# Patient Record
Sex: MALE | Race: WHITE | NOT HISPANIC OR LATINO | Employment: OTHER | URBAN - METROPOLITAN AREA
[De-identification: names, ages, dates, MRNs, and addresses within clinical notes are randomized per-mention and may not be internally consistent; named-entity substitution may affect disease eponyms.]

---

## 2017-02-24 ENCOUNTER — APPOINTMENT (OUTPATIENT)
Dept: GENERAL RADIOLOGY | Facility: CLINIC | Age: 62
End: 2017-02-24
Attending: EMERGENCY MEDICINE
Payer: COMMERCIAL

## 2017-02-24 ENCOUNTER — HOSPITAL ENCOUNTER (OUTPATIENT)
Facility: CLINIC | Age: 62
Setting detail: OBSERVATION
Discharge: HOME | End: 2017-02-25
Attending: EMERGENCY MEDICINE | Admitting: INTERNAL MEDICINE
Payer: COMMERCIAL

## 2017-02-24 DIAGNOSIS — R06.09 DYSPNEA ON EXERTION: ICD-10-CM

## 2017-02-24 DIAGNOSIS — I10 BENIGN ESSENTIAL HYPERTENSION: Primary | ICD-10-CM

## 2017-02-24 LAB
ANION GAP SERPL CALCULATED.3IONS-SCNC: 9 MMOL/L (ref 3–14)
BASOPHILS # BLD AUTO: 0.1 10E9/L (ref 0–0.2)
BASOPHILS NFR BLD AUTO: 1 %
BUN SERPL-MCNC: 13 MG/DL (ref 7–30)
CALCIUM SERPL-MCNC: 8.5 MG/DL (ref 8.5–10.1)
CHLORIDE SERPL-SCNC: 102 MMOL/L (ref 94–109)
CO2 SERPL-SCNC: 28 MMOL/L (ref 20–32)
CREAT SERPL-MCNC: 0.88 MG/DL (ref 0.66–1.25)
D DIMER PPP FEU-MCNC: 0.3 UG/ML FEU (ref 0–0.5)
DIFFERENTIAL METHOD BLD: NORMAL
EOSINOPHIL # BLD AUTO: 0.2 10E9/L (ref 0–0.7)
EOSINOPHIL NFR BLD AUTO: 2.5 %
ERYTHROCYTE [DISTWIDTH] IN BLOOD BY AUTOMATED COUNT: 12.4 % (ref 10–15)
GFR SERPL CREATININE-BSD FRML MDRD: 89 ML/MIN/1.7M2
GLUCOSE SERPL-MCNC: 96 MG/DL (ref 70–99)
HCT VFR BLD AUTO: 50.4 % (ref 40–53)
HGB BLD-MCNC: 17.1 G/DL (ref 13.3–17.7)
IMM GRANULOCYTES # BLD: 0.1 10E9/L (ref 0–0.4)
IMM GRANULOCYTES NFR BLD: 0.6 %
LYMPHOCYTES # BLD AUTO: 2.7 10E9/L (ref 0.8–5.3)
LYMPHOCYTES NFR BLD AUTO: 33.7 %
MCH RBC QN AUTO: 30.8 PG (ref 26.5–33)
MCHC RBC AUTO-ENTMCNC: 33.9 G/DL (ref 31.5–36.5)
MCV RBC AUTO: 91 FL (ref 78–100)
MONOCYTES # BLD AUTO: 0.8 10E9/L (ref 0–1.3)
MONOCYTES NFR BLD AUTO: 10.4 %
NEUTROPHILS # BLD AUTO: 4.2 10E9/L (ref 1.6–8.3)
NEUTROPHILS NFR BLD AUTO: 51.8 %
NRBC # BLD AUTO: 0 10*3/UL
NRBC BLD AUTO-RTO: 0 /100
NT-PROBNP SERPL-MCNC: 27 PG/ML (ref 0–900)
PLATELET # BLD AUTO: 321 10E9/L (ref 150–450)
POTASSIUM SERPL-SCNC: 3.4 MMOL/L (ref 3.4–5.3)
RBC # BLD AUTO: 5.56 10E12/L (ref 4.4–5.9)
SODIUM SERPL-SCNC: 139 MMOL/L (ref 133–144)
TROPONIN I SERPL-MCNC: NORMAL UG/L (ref 0–0.04)
WBC # BLD AUTO: 8.1 10E9/L (ref 4–11)

## 2017-02-24 PROCEDURE — 84484 ASSAY OF TROPONIN QUANT: CPT | Performed by: EMERGENCY MEDICINE

## 2017-02-24 PROCEDURE — 83880 ASSAY OF NATRIURETIC PEPTIDE: CPT | Performed by: EMERGENCY MEDICINE

## 2017-02-24 PROCEDURE — 80048 BASIC METABOLIC PNL TOTAL CA: CPT | Performed by: EMERGENCY MEDICINE

## 2017-02-24 PROCEDURE — 93005 ELECTROCARDIOGRAM TRACING: CPT

## 2017-02-24 PROCEDURE — 71020 XR CHEST 2 VW: CPT

## 2017-02-24 PROCEDURE — 85025 COMPLETE CBC W/AUTO DIFF WBC: CPT | Performed by: EMERGENCY MEDICINE

## 2017-02-24 PROCEDURE — 25000132 ZZH RX MED GY IP 250 OP 250 PS 637: Performed by: EMERGENCY MEDICINE

## 2017-02-24 PROCEDURE — 85379 FIBRIN DEGRADATION QUANT: CPT | Performed by: EMERGENCY MEDICINE

## 2017-02-24 PROCEDURE — 99285 EMERGENCY DEPT VISIT HI MDM: CPT | Mod: 25

## 2017-02-24 RX ORDER — LISINOPRIL 10 MG/1
20 TABLET ORAL ONCE
Status: COMPLETED | OUTPATIENT
Start: 2017-02-24 | End: 2017-02-24

## 2017-02-24 RX ORDER — LIDOCAINE 40 MG/G
CREAM TOPICAL
Status: DISCONTINUED | OUTPATIENT
Start: 2017-02-24 | End: 2017-02-25

## 2017-02-24 RX ADMIN — LISINOPRIL 20 MG: 10 TABLET ORAL at 23:21

## 2017-02-24 NOTE — IP AVS SNAPSHOT
[facility] Observation Department    [address]    [facility] [address]    Phone:  [phone]                                       After Visit Summary   2/24/2017    Jorden Benavidez    MRN: [id number]           After Visit Summary Signature Page     I have received my discharge instructions, and my questions have been answered. I have discussed any challenges I see with this plan with the nurse or doctor.    ..........................................................................................................................................  Patient/Patient Representative Signature      ..........................................................................................................................................  Patient Representative Print Name and Relationship to Patient    ..................................................               ................................................  Date                                            Time    ..........................................................................................................................................  Reviewed by Signature/Title    ...................................................              ..............................................  Date                                                            Time

## 2017-02-24 NOTE — IP AVS SNAPSHOT
MRN:[id number]                      After Visit Summary   2/24/2017    Jorden Benavidez    MRN: [id number]           Thank you!     Thank you for choosing [facility] for your care. Our goal is always to provide you with excellent care. Hearing back from our patients is one way we can continue to improve our services. Please take a few minutes to complete the written survey that you may receive in the mail after you visit. If you would like to speak to someone directly about your visit please contact Patient Relations at [phone]. Thank you!          Patient Information     Date Of Birth          1955        About your hospital stay     You were admitted on:  February 25, 2017 You last received care in the:  [facility] Observation Department    You were discharged on:  February 25, 2017        Reason for your hospital stay       Shortness of breath on exertion and hypertension. Serial troponins were negative and stress echocardiogram was negative for significant coronary artery ischemia. Started on Lisinopril for hypertension. Lipid panel was checked and levels are mildly elevated.                  Who to Call     For medical emergencies, please call [phone].  For non-urgent questions about your medical care, please call your primary care provider or clinic, [phone]          Attending Provider     Provider Specialty    Jose D Siu MD Emergency Medicine    [facility]       Primary Care Provider Office Phone # Fax #    [facility] [phone]       [address] [place] [address], [id number]  [facility] [address]         When to contact your care team       Call your primary doctor if you have any of the following: temperature greater than 101, increased shortness of breath or increased chest pain.                  After Care Instructions     Activity       Your activity upon discharge: activity  "as tolerated            Diet       Follow this diet upon discharge: Regular                  Follow-up Appointments     Follow-up and recommended labs and tests        Establish care with primary care provider, [facility], within 7-14 days for hospital follow- up.  The following labs/tests are recommended: BMP due to starting lisinopril.  Discuss lifestyle changes to improve blood pressure and cholesterol.   Discuss lipid panel and statin use if indicated.                             Pending Results     Date and Time Order Name Status Description    2017 2146 EKG 12 lead Preliminary             Statement of Approval     Ordered          17 1208  I have reviewed and agree with all the recommendations and orders detailed in this document.  EFFECTIVE NOW     Approved and electronically signed by:  Eliane Mtz PA-C             Admission Information     Date & Time Provider Department Dept. Phone    2017 Juan C Woo MD [facility] Observation Department [phone]      Your Vitals Were     Blood Pressure Pulse Temperature Respirations Height Weight    145/93 (BP Location: Right arm) 92 97.2  F (36.2  C) (Oral) 16 1.778 m (5' 10\") 94.8 kg (209 lb)    Pulse Oximetry BMI (Body Mass Index)                94% 29.99 kg/m2          MyChart Information     [facility] lets you send messages to your doctor, view your test results, renew your prescriptions, schedule appointments and more. To sign up, go to [email or web address] . Click on \"Log in\" on the left side of the screen, which will take you to the Welcome page. Then click on \"Sign up Now\" on the right side of the page.     You will be asked to enter the access code listed below, as well as some personal information. Please follow the directions to create your username and password.     Your access code is: [email or web address]  Expires: 2017 12:11 PM     Your access code will  in 90 days. If you " need help or a new code, please call your [place] clinic or [phone].        Care EveryWhere ID     This is your Care EveryWhere ID. This could be used by other organizations to access your [place] medical records  [id number]           Review of your medicines      START taking        Dose / Directions    lisinopril 10 MG tablet   Commonly known as:  PRINIVIL/ZESTRIL   Used for:  Benign essential hypertension        Dose:  10 mg   Take 1 tablet (10 mg) by mouth daily   Quantity:  30 tablet   Refills:  0            Where to get your medicines      These medications were sent to [id number] - [place] - [address] [facility]  [address], [place] [address]     Phone:  [phone]     lisinopril 10 MG tablet                Protect others around you: Learn how to safely use, store and throw away your medicines at www.disposemymeds.org.             Medication List: This is a list of all your medications and when to take them. Check marks below indicate your daily home schedule. Keep this list as a reference.      Medications           Morning Afternoon Evening Bedtime As Needed    lisinopril 10 MG tablet   Commonly known as:  PRINIVIL/ZESTRIL   Take 1 tablet (10 mg) by mouth daily   Last time this was given:  10 mg on 2/25/2017 10:15 AM

## 2017-02-25 ENCOUNTER — APPOINTMENT (OUTPATIENT)
Dept: CARDIOLOGY | Facility: CLINIC | Age: 62
End: 2017-02-25
Attending: INTERNAL MEDICINE
Payer: COMMERCIAL

## 2017-02-25 VITALS
DIASTOLIC BLOOD PRESSURE: 93 MMHG | OXYGEN SATURATION: 94 % | SYSTOLIC BLOOD PRESSURE: 145 MMHG | WEIGHT: 209 LBS | TEMPERATURE: 97.2 F | HEART RATE: 92 BPM | HEIGHT: 70 IN | RESPIRATION RATE: 16 BRPM | BODY MASS INDEX: 29.92 KG/M2

## 2017-02-25 PROBLEM — R06.02 SOB (SHORTNESS OF BREATH): Status: ACTIVE | Noted: 2017-02-25

## 2017-02-25 LAB
CHOLEST SERPL-MCNC: 225 MG/DL
HDLC SERPL-MCNC: 42 MG/DL
LDLC SERPL CALC-MCNC: 149 MG/DL
NONHDLC SERPL-MCNC: 183 MG/DL
TRIGL SERPL-MCNC: 171 MG/DL
TROPONIN I SERPL-MCNC: NORMAL UG/L (ref 0–0.04)
TROPONIN I SERPL-MCNC: NORMAL UG/L (ref 0–0.04)
TSH SERPL DL<=0.005 MIU/L-ACNC: 3.18 MU/L (ref 0.4–4)

## 2017-02-25 PROCEDURE — 93018 CV STRESS TEST I&R ONLY: CPT | Performed by: INTERNAL MEDICINE

## 2017-02-25 PROCEDURE — 96374 THER/PROPH/DIAG INJ IV PUSH: CPT

## 2017-02-25 PROCEDURE — 93016 CV STRESS TEST SUPVJ ONLY: CPT | Performed by: INTERNAL MEDICINE

## 2017-02-25 PROCEDURE — 99217 ZZC OBSERVATION CARE DISCHARGE: CPT | Performed by: PHYSICIAN ASSISTANT

## 2017-02-25 PROCEDURE — 84443 ASSAY THYROID STIM HORMONE: CPT | Performed by: INTERNAL MEDICINE

## 2017-02-25 PROCEDURE — 99219 ZZC INITIAL OBSERVATION CARE,LEVL II: CPT | Performed by: INTERNAL MEDICINE

## 2017-02-25 PROCEDURE — 25000132 ZZH RX MED GY IP 250 OP 250 PS 637: Performed by: INTERNAL MEDICINE

## 2017-02-25 PROCEDURE — G0378 HOSPITAL OBSERVATION PER HR: HCPCS

## 2017-02-25 PROCEDURE — 93350 STRESS TTE ONLY: CPT | Mod: 26 | Performed by: INTERNAL MEDICINE

## 2017-02-25 PROCEDURE — 80061 LIPID PANEL: CPT | Performed by: INTERNAL MEDICINE

## 2017-02-25 PROCEDURE — 93321 DOPPLER ECHO F-UP/LMTD STD: CPT | Mod: 26 | Performed by: INTERNAL MEDICINE

## 2017-02-25 PROCEDURE — 93325 DOPPLER ECHO COLOR FLOW MAPG: CPT | Mod: 26 | Performed by: INTERNAL MEDICINE

## 2017-02-25 PROCEDURE — 25000125 ZZHC RX 250: Performed by: INTERNAL MEDICINE

## 2017-02-25 PROCEDURE — 36415 COLL VENOUS BLD VENIPUNCTURE: CPT | Performed by: INTERNAL MEDICINE

## 2017-02-25 PROCEDURE — 40000264 ECHO STRESS WITH OPTISON

## 2017-02-25 PROCEDURE — 25500064 ZZH RX 255 OP 636: Performed by: INTERNAL MEDICINE

## 2017-02-25 PROCEDURE — 84484 ASSAY OF TROPONIN QUANT: CPT | Performed by: INTERNAL MEDICINE

## 2017-02-25 RX ORDER — NALOXONE HYDROCHLORIDE 0.4 MG/ML
.1-.4 INJECTION, SOLUTION INTRAMUSCULAR; INTRAVENOUS; SUBCUTANEOUS
Status: DISCONTINUED | OUTPATIENT
Start: 2017-02-25 | End: 2017-02-25 | Stop reason: HOSPADM

## 2017-02-25 RX ORDER — TEMAZEPAM 7.5 MG/1
15 CAPSULE ORAL
Status: DISCONTINUED | OUTPATIENT
Start: 2017-02-25 | End: 2017-02-25 | Stop reason: HOSPADM

## 2017-02-25 RX ORDER — AMOXICILLIN 250 MG
1-2 CAPSULE ORAL 2 TIMES DAILY
Status: DISCONTINUED | OUTPATIENT
Start: 2017-02-25 | End: 2017-02-25 | Stop reason: HOSPADM

## 2017-02-25 RX ORDER — LISINOPRIL 10 MG/1
10 TABLET ORAL DAILY
Status: DISCONTINUED | OUTPATIENT
Start: 2017-02-25 | End: 2017-02-25 | Stop reason: HOSPADM

## 2017-02-25 RX ORDER — ONDANSETRON 2 MG/ML
4 INJECTION INTRAMUSCULAR; INTRAVENOUS EVERY 6 HOURS PRN
Status: DISCONTINUED | OUTPATIENT
Start: 2017-02-25 | End: 2017-02-25 | Stop reason: HOSPADM

## 2017-02-25 RX ORDER — ACETAMINOPHEN 325 MG/1
650 TABLET ORAL EVERY 4 HOURS PRN
Status: DISCONTINUED | OUTPATIENT
Start: 2017-02-25 | End: 2017-02-25 | Stop reason: HOSPADM

## 2017-02-25 RX ORDER — HYDRALAZINE HYDROCHLORIDE 20 MG/ML
10 INJECTION INTRAMUSCULAR; INTRAVENOUS EVERY 4 HOURS PRN
Status: DISCONTINUED | OUTPATIENT
Start: 2017-02-25 | End: 2017-02-25 | Stop reason: HOSPADM

## 2017-02-25 RX ORDER — LISINOPRIL 10 MG/1
10 TABLET ORAL DAILY
Qty: 30 TABLET | Refills: 0 | Status: SHIPPED | OUTPATIENT
Start: 2017-02-25 | End: 2024-04-30

## 2017-02-25 RX ORDER — ONDANSETRON 4 MG/1
4 TABLET, ORALLY DISINTEGRATING ORAL EVERY 6 HOURS PRN
Status: DISCONTINUED | OUTPATIENT
Start: 2017-02-25 | End: 2017-02-25 | Stop reason: HOSPADM

## 2017-02-25 RX ORDER — POLYETHYLENE GLYCOL 3350 17 G/17G
17 POWDER, FOR SOLUTION ORAL DAILY PRN
Status: DISCONTINUED | OUTPATIENT
Start: 2017-02-25 | End: 2017-02-25 | Stop reason: HOSPADM

## 2017-02-25 RX ADMIN — HYDRALAZINE HYDROCHLORIDE 10 MG: 20 INJECTION INTRAMUSCULAR; INTRAVENOUS at 01:05

## 2017-02-25 RX ADMIN — HUMAN ALBUMIN MICROSPHERES AND PERFLUTREN 4 ML: 10; .22 INJECTION, SOLUTION INTRAVENOUS at 09:37

## 2017-02-25 RX ADMIN — LISINOPRIL 10 MG: 10 TABLET ORAL at 10:15

## 2017-02-25 NOTE — DISCHARGE SUMMARY
[facility] Outpatient / Observation Unit  Discharge Summary        Jorden Benavidez MRN# [id number]   YOB: 1955 Age: 61 year old     Date of Admission:  2/24/2017  Date of Discharge:  2/25/2017  Admitting Physician:  Juan C Woo MD  Discharge Physician: Eliane Mtz PA-C  Discharging Service: Hospitalist      Primary Provider: [facility], [facility]  Primary Care Physician Phone Number: [phone]         Primary Discharge Diagnoses:    Jorden Benavidez was admitted on 2/24/2017 for concerns of increasing SOB.     1. SOB - concerning for anginal equivalent. Ruled out ACS. Stress echocardiogram was negative for inducible ischemia. Suspect related to untreated hypertension, started on 10 mg PO Lisinopril.  2. HTN - untreated, has not seen medical provider in many yrs, started on Lisinopril, need to establish care with PCP for further management  3. HLP - lipid panel - total cholesterol 225, , HDL 42 and triglycerides 171. Discussed results, recommend follow up with PCP to discuss management with lifestyle changes only vs statin.          Secondary Discharge Diagnoses:   History reviewed. No pertinent past medical history.  HTN           Code Status:      Full Code        Brief Hospital Summary:        Reason for your hospital stay       Shortness of breath on exertion and hypertension. Serial troponins were   negative and stress echocardiogram was negative for significant coronary   artery ischemia. Started on Lisinopril for hypertension. Lipid panel was   checked and levels are mildly elevated.                    Please refer to initial admission history and physical for further details.   Briefly, Jorden Benavidez was admitted on 2/24/2017 for concerns of increasing shortness of breath.   Initial work up in the ED did not reveal evidence of STEMI or findings consistent with unstable angina or acute coronary ischemia. Work up also negative for infectious etiology. Pt  was registered to the Observation Unit for further evaluation.   Pt ruled out with serial troponins, underwent Stress Echo that did not show evidence of significant coronary ischemia. Labs were reviewed and significant results addressed. On the day of discharge, pt was pain free, with no complaints of pain. Medications were reviewed and adjustments made as necessary. Pt is instructed to follow up as below.           Significant Lab During Hospitalization:        Recent Labs  Lab 02/24/17 2148   WBC 8.1   HGB 17.1   HCT 50.4   MCV 91          Recent Labs  Lab 02/24/17 2148      POTASSIUM 3.4   CHLORIDE 102   CO2 28   ANIONGAP 9   GLC 96   BUN 13   CR 0.88   GFRESTIMATED 89   GFRESTBLACK >90African American GFR Calc   NELLA 8.5       Recent Labs  Lab 02/24/17 2148   NTBNPI 27       Recent Labs  Lab 02/24/17 2148   DD 0.3       Recent Labs  Lab 02/25/17  0635   CHOL 225*   HDL 42   *   TRIG 171*       Recent Labs  Lab 02/25/17  0635   TSH 3.18       Recent Labs  Lab 02/25/17  0635 02/25/17  0110 02/24/17 2148   TROPI <0.015The 99th percentile for upper reference range is 0.045 ug/L.  Troponin values in the range of 0.045 - 0.120 ug/L may be associated with risks of adverse clinical events. <0.015The 99th percentile for upper reference range is 0.045 ug/L.  Troponin values in the range of 0.045 - 0.120 ug/L may be associated with risks of adverse clinical events. <0.015The 99th percentile for upper reference range is 0.045 ug/L.  Troponin values in the range of 0.045 - 0.120 ug/L may be associated with risks of adverse clinical events.                Significant Imaging During Hospitalization:      Recent Results (from the past 48 hour(s))   XR Chest 2 Views    Narrative    XR CHEST 2 VW 2/24/2017 11:00 PM    HISTORY: Chest pain.    COMPARISON: None.    FINDINGS: No airspace consolidation, pleural effusion or pneumothorax.  Normal heart size.      Impression    IMPRESSION: No acute cardiopulmonary  "abnormality.    SNOW CORADO MD              Pending Results:        Unresulted Labs Ordered in the Past 30 Days of this Admission     No orders found for last 61 day(s).              Consultations This Hospital Stay:      No consultations were requested during this admission         Discharge Instructions and Follow-Up:      Follow-up Appointments     Follow-up and recommended labs and tests        Establish care with primary care provider, [facility], within 7-14 days for hospital follow- up.  The following   labs/tests are recommended: BMP due to starting lisinopril.  Discuss lifestyle changes to improve blood pressure and cholesterol.   Discuss lipid panel and statin use if indicated.                  Pt instructed to follow up with PCP in 7 days  Follow-up Labs BMP        Discharge Disposition:      Discharged to home         Discharge Medications:        Current Discharge Medication List      START taking these medications    Details   lisinopril (PRINIVIL/ZESTRIL) 10 MG tablet Take 1 tablet (10 mg) by mouth daily  Qty: 30 tablet, Refills: 0    Associated Diagnoses: Benign essential hypertension                 Allergies:       No Known Allergies        Condition and Physical on Discharge:      Discharge condition: Stable   Vitals: Blood pressure (!) 145/93, pulse 92, temperature 97.2  F (36.2  C), temperature source Oral, resp. rate 16, height 1.778 m (5' 10\"), weight 94.8 kg (209 lb), SpO2 94 %.  209 lbs 0 oz      GENERAL:  Comfortable.  PSYCH: pleasant, oriented, No acute distress.  HEART: RRR.  LUNGS:  Normal Respiratory effort.    EXTREMITIES:  Able to ambulate independently.  SKIN:  Dry to touch, No obvious rash, wound or ulcerations.  NEUROLOGIC:  Grossly intact    Eliane Mtz PA-C  "

## 2017-02-25 NOTE — PHARMACY-ADMISSION MEDICATION HISTORY
Admission medication history interview status for this patient is complete. See [facility] admission navigator for allergy information, prior to admission medications and immunization status.     Medication history interview source(s):Patient  Medication history resources (including written lists, pill bottles, clinic record):None  [facility]    Changes made to PTA medication list:  Added: N/A  Deleted: N/  Changed: N/A    Actions taken by pharmacist (provider contacted, etc):None     Additional medication history information: Patient reports taking no medications at home.    Medication reconciliation/reorder completed by provider prior to medication history? Yes    For patients on insulin therapy: No

## 2017-02-25 NOTE — ED NOTES
Pt presents to ED with wife reporting at 915 tonight he developed SOB, generalized weakness and SOB. Wife concerned for stroke. No slurred speech. ABCs intact. A/Ox3

## 2017-02-25 NOTE — PLAN OF CARE
Problem: Discharge Planning  Goal: Discharge Planning (Adult, OB, Behavioral, Peds)  Outcome: Improving  ROOM #     Living Situation (if not independent, order SW consult): Lives with wife  Facility name:     Activity level at baseline: Ind  Activity level on admit: ind     Is patient a falls risk? No  Falls armband on? No, Not applicable  Within Arm's Reach? No.Reason not within arm's reach is:   Bed alarm turned on?   No, Patient is alert and oriented x4, agreed to call if need assistance.   Personal alarm in place and turned on?   No, Not applicable     Patient registered to observation; given Patient Bill of Rights; given the opportunity to ask questions about observation status and their plan of care.  Patient has been oriented to the observation room, bathroom and call light is in place.     : Wife

## 2017-02-25 NOTE — H&P
H&P dictated.  Admit for uncontrolled HTN and DOOLEY    Plan  R/o MI  Stress echo in AM  Lisinopril started

## 2017-02-25 NOTE — PLAN OF CARE
Problem: Goal Outcome Summary  Goal: Goal Outcome Summary  Outcome: No Change  Problem: Discharge Planning  Goal: Discharge Planning (Adult, OB, Behavioral, Peds)  Outcome: Improving  PRIMARY DIAGNOSIS: CHEST PAIN  OUTPATIENT/OBSERVATION GOALS TO BE MET BEFORE DISCHARGE:      1. Negative Serial Troponin Yes, neg x2  2. Resolution of chest pain Yes  3. Pain status: Pain free.  4. Negative stress test: Exercise test scheduled.   5. Stable vital signs yes after prn hydralazine given.  6. ADLs back to baseline? Yes  7. Activity and level of assistance: Ambulating independently.  8. Barriers to discharge noted Yes , Exercise stress chuck   9.Interpretation of rhythm per telemetry tech: SR/ST       Is patient a falls risk? No  Falls armband on? No, Not applicable  Within Arm's Reach? No.Reason not within arm's reach is:   Bed alarm turned on? No, Patient is alert and oriented x4, agreed to call if need assistance.   Personal alarm in place and turned on? No, Not applicable      Patient is alert and oriented x4, independent x4. Denies chest pain, SOB, and lightheadedness.  Patient is on tele monitoring, tele tech reports patient is SR/ST . Exercise stress test completed. Will continue to monitor, assess, and offer supportive cares.

## 2017-02-25 NOTE — PLAN OF CARE
Problem: Discharge Planning  Goal: Discharge Planning (Adult, OB, Behavioral, Peds)  Outcome: Improving  PRIMARY DIAGNOSIS: CHEST PAIN  OUTPATIENT/OBSERVATION GOALS TO BE MET BEFORE DISCHARGE:     1. Negative Serial Troponin Yes, neg x1  2. Resolution of chest pain Yes  3. Pain status: Pain free.  4. Negative stress test: Exercise test scheduled.   5. Stable vital signs No /105, prn hydralazine given.  6. ADLs back to baseline?  Yes  7. Activity and level of assistance: Ambulating independently.  8. Barriers to discharge noted Yes , Exercise stress chuck   9.Interpretation of rhythm per telemetry tech: SR./ST      Is patient a falls risk? No  Falls armband on? No, Not applicable  Within Arm's Reach? No.Reason not within arm's reach is:   Bed alarm turned on? No, Patient is alert and oriented x4, agreed to call if need assistance.   Personal alarm in place and turned on? No, Not applicable     Patient is alert and oriented x4, independent x4. Denies chest pain, SOB, and lightheadedness. BP elevated 179/105, prn hydralazine given. Patient is on tele monitoring, tele tech reports patient is SR/ST . Exercise stress test scheduled. Will continue to monitor, assess, and offer supportive cares.

## 2017-02-25 NOTE — ED PROVIDER NOTES
History     Chief Complaint:    Shortness of Breath and Dizziness      HPI   Jorden Benavidez is a 61 year old male who presents with lightheadedness and shortness of breath.    Pt has no past medical history, but really has not seen a physician for 20 years. Pt sattes over the last two weeks, he has been more short of breath with exertion and activity. No chest pain, just shortness of breath. Pt states this evening at dinner he began to feel dizzy, described as feeling lightheaed, and was brought to the ER for evaluation. On arrival pt feels ok sitting still, here with wife who recently had foot surgery. Pt states he has been going up and down the stairs frequently to help her and feels very winded just climbing the stairs.      Allergies:    No Known Allergies     Medications:        No current outpatient prescriptions on file.    Problem List:      There are no active problems to display for this patient.       Past Medical History:      History reviewed. No pertinent past medical history.    Past Surgical History:      Past Surgical History   Procedure Laterality Date     C nonspecific procedure       right wrist fx       Family History:      No family history on file.    Social History:    Marital Status:   [2]  Social History   Substance Use Topics     Smoking status: Never Smoker     Smokeless tobacco: Not on file     Alcohol use Yes        Review of Systems  No chest pain with exertion, no cough, dizziness descibed as lightheaded ness, flushing, all other symtpoms negative.    Physical Exam   First Vitals:  BP: (!) 169/115  Pulse: 96  Heart Rate: 96  Temp: 98.3  F (36.8  C)  Resp: 18  Weight: 92.1 kg (203 lb)  SpO2: 95 %    Physical Exam   Constitutional: He is oriented to person, place, and time. He appears well-developed.   HENT:   Head: Normocephalic and atraumatic.   Right Ear: External ear normal.   Mouth/Throat: Oropharynx is clear and moist.   Eyes: Conjunctivae and EOM are normal. Pupils are  equal, round, and reactive to light.   Neck: Normal range of motion. Neck supple. No JVD present.   Cardiovascular: Normal rate, regular rhythm and normal heart sounds.    Pulmonary/Chest: Effort normal and breath sounds normal.   Abdominal: Soft. Bowel sounds are normal. He exhibits no distension. There is no tenderness. There is no rebound.   Musculoskeletal: Normal range of motion.   Lymphadenopathy:     He has no cervical adenopathy.   Neurological: He is alert and oriented to person, place, and time. He displays normal reflexes. No cranial nerve deficit. He exhibits normal muscle tone. Coordination normal.   Skin: Skin is warm and dry.   Psychiatric: He has a normal mood and affect. His behavior is normal. Judgment normal.   Nursing note and vitals reviewed.        Emergency Department Course   ECG:  ECG:  Ventricular rate: 95bpm  IL interval: 162ms  QRS duration 104ms  QT/QTc 368/462ms  Findings: normal sinus rythmn       Imaging:  Radiographic findings were communicated with the patient and family who voiced understanding of the findings.  Normal chest xray     Laboratory:  BNP troponin d dimer negative.  Hemoglobin ormal.    BMP normal    Interventions:  Lisinopril 20mg PO x 1    Emergency Department Course:  PT observed in the ER discussed concerns for atypical cardiac presentations and due to new exertional intolerance, recommend admission for echo and or stress test.    ED Course       Impression & Plan        Medical Decision Making:  Pt presents with flushing and dyspnea and dyspnea with exertion over the last two weeks. Pt is low risk for PE, but due to age over 60 d dimer sent and negative. Blood pressure is elevated, but MAP is not criticall elevated, and patient symptoms non specific. However due to new dyspnea on exertion, due to normal pulmonary evaluation, recommend observatoin admit for further evaluation for cardiac presentation including stress test, care discussed with Aida Hung and  admitted for observation.        Diagnosis:  No diagnosis found.    Disposition:  Admitted to [facility], telemetry observation.      Discharge Medications:  New Prescriptions    No medications on file         Jose D Siu  2/24/2017   [facility] EMERGENCY DEPARTMENT       Jose D Siu MD  02/24/17 7490

## 2017-02-25 NOTE — PROGRESS NOTES
Discharge instructions given, iv removed, meds will be filled at [facility]. Patient will walk to vehicle.    obs end time 4520

## 2017-02-25 NOTE — PLAN OF CARE
Problem: Discharge Planning  Goal: Discharge Planning (Adult, OB, Behavioral, Peds)  Outcome: Improving  PRIMARY DIAGNOSIS: CHEST PAIN  OUTPATIENT/OBSERVATION GOALS TO BE MET BEFORE DISCHARGE:     1. Negative Serial Troponin Yes, neg x2  2. Resolution of chest pain Yes  3. Pain status: Pain free.  4. Negative stress test: Exercise test scheduled.   5. Stable vital signs yes after prn hydralazine given.  6. ADLs back to baseline?  Yes  7. Activity and level of assistance: Ambulating independently.  8. Barriers to discharge noted Yes , Exercise stress chuck   9.Interpretation of rhythm per telemetry tech: SR/ST      Is patient a falls risk? No  Falls armband on? No, Not applicable  Within Arm's Reach? No.Reason not within arm's reach is:   Bed alarm turned on? No, Patient is alert and oriented x4, agreed to call if need assistance.   Personal alarm in place and turned on? No, Not applicable     Patient is alert and oriented x4, independent x4. Denies chest pain, SOB, and lightheadedness. BP was elevated 179/105  After 10mg of hydralazine given BP came down to 146/90. Patient is on tele monitoring, tele tech reports patient is SR/ST . Exercise stress test scheduled. Will continue to monitor, assess, and offer supportive cares.

## 2017-02-25 NOTE — H&P
CHIEF COMPLAINT:  Shortness of breath.  The patient was found to have a significantly elevated blood pressure on arrival to the ER, and is being admitted to rule out myocardial infarction and have a cardiac workup.      HISTORY OF PRESENT ILLNESS:  Mr. Jorden Benavidez is a 61-year-old male who has not seen a physician for, he estimates, about 20 years.  The patient presented to the hospital this evening for the above concerns.  The patient recently has been noticing increasing shortness of breath.  His wife also correlates this story, and she provides most of the history, not because the patient is confused, but he is a somewhat quiet gentleman and she does most of the talking.  Apparently, over the past few weeks to months, she has noticed the patient looking more short of breath.  He also agrees to this assessment.  Apparently, recently he also had his blood pressure checked at a local drugstore.  The monitor did not give specific numbers, but simply stated he should urgently go and get evaluated at an emergency department.  He declined this, and instead was planning to follow up in the outpatient setting.  However, it sounds like an appointment was never made.      This evening, the patient noted increased shortness of breath.  He also felt lightheaded and, according to his wife, appeared weak and flushed, which prompted his appearance in the Emergency Department.      On arrival to the ER, vital signs included blood pressure of 170/115 on arrival with a heart rate of 95.  Afebrile.  Saturation 95% on room air.  Workup in the ER included basic labs and imaging studies.  All of his labs were normal including a CBC, D-dimer, BNP, troponin, and BMP.      Chest x-ray was also done, showing no acute findings.  I have personally reviewed the chest x-ray, and lungs appear clear on my read.      EKG was also performed that, by my read, shows sinus rhythm with no acute ST changes or pathologic Q-waves.      The patient  "currently is asymptomatic.  He was given lisinopril 20 mg for control of blood pressure, and is being admitted to the observation unit.      I spoke with Dr. Siu of the Emergency Department about this patient on admission to the hospital.      PAST MEDICAL HISTORY:  The patient and his wife relay a story that he was once told by a physician that \"the strongest part of his heart was not working properly.\"  Apparently, this was found on an EKG, and followed up with an echocardiogram.  He was never told he had exercise restrictions, never told that he needed a follow-up for this, and apparently this has not affected him in any way.  I am not sure what this means, but he has never otherwise had any cardiac abnormalities.      CURRENT MEDICATIONS:  None.      ALLERGIES:  None.      FAMILY HISTORY:  Brother had lymphoma, which apparently was related to exposure to chemicals from his employment.      SOCIAL HISTORY:  The patient drinks two beers per day.  No history of alcohol abuse or alcohol withdrawal.  No history of DUI or DWI.  Denies history of alcoholism.  Nonsmoker.      REVIEW OF SYSTEMS:  Please see HPI for details.  Comprehensive greater than 10-point review of systems otherwise negative besides as detailed above.      PHYSICAL EXAM:   VITAL SIGNS:  Blood pressure is currently 170/115 with a heart rate of 90.  Afebrile.  Saturation 92% on room air.   GENERAL:  The patient appears nontoxic and in no acute distress.  He appears awake, alert and oriented x3.  He has a somewhat quiet demeanor and his wife does a lot of the talking for him, although he clearly can answer questions himself.   HEENT:  Head is atraumatic.  Sclerae white.  Eyelids normal.  Conjunctivae normal.  Extraocular movements are intact.   NECK:  Supple.  No cervical or supraclavicular lymphadenopathy.   HEART:  Regular rate and rhythm.  No significant murmurs.  No lower extremity edema.   LUNGS:  Clear to auscultation bilaterally.  No " intercostal retractions.  No conversational dyspnea.   ABDOMEN:  Nontender, nondistended.  Soft.  No masses.  No organomegaly.   EXTREMITIES:  No edema.   SKIN:  No rash.  No jaundice.  Skin is dry to touch.   NEUROLOGIC:  Cranial nerves II-XII are intact.  Moves all extremities appropriately.  Sensation intact to light touch in the upper and lower extremities bilaterally.   PSYCHIATRIC:  The patient is awake, alert and oriented x3.  Mood and affect are normal and appropriate.      LABORATORY AND IMAGING DATA:  Reviewed above in HPI.      IMPRESSION:  Mr. Benavidez is a 61-year-old male with no significant medical history, although admittedly he has not seen a physician for quite some time, and presents to the hospital this evening for concerns about shortness of breath.  It sounds like symptoms of the shortness of breath have been progressing for the last few weeks to months.  He also recently was at a local pharmacy and checked his blood pressure on a blood pressure machine, and told to seek medical attention immediately, which he did not do.  He had presented to the hospital this evening for concerns about worsening shortness of breath, lightheadedness, and flushing.  He was noted to have hypertension on presentation, and is being admitted to the Hospitalist Service for further cardiac evaluation and control of blood pressure.   1.  Uncontrolled hypertension:  Suspect he has had this for quite some time, likely a chronic condition for him.   2.  Shortness of breath, lightheadedness, weakness:  May be related to uncontrolled hypertension, but given risk factors, I agree with admission to the hospital to rule out for myocardial infarction.  Normal BNP and chest x-ray results show no evidence of pulmonary edema, and a normal D-dimer makes pulmonary embolism very unlikely.  Rule out cardiac ischemia or anginal equivalent.      PLAN:   1.  Admit to observation.   2.  Lisinopril 10 mg daily will be started.  He received a  dose of 20 mg this evening in the ER.   3.  Stress echocardiogram, assuming the patient completes a rule-out for myocardial infarction with serial troponin enzymes to rule out for myocardial infarction.   4.  Check lipid panel and TSH for completeness, as he has not seen a doctor for many, many years.   5.  Assuming stress test is negative, likely discharge later on today.  If stress test is positive, he will need Cardiology evaluation.         NADEEN DOUGHERTY MD             D: 2017 00:14   T: 2017 01:03   MT: [id number]      Name:     CARMEN CHEN   MRN:      [id number]        Account:      [id number]   :      1955           Admitted:     [id number]      Document: [id number]       cc: [facility]

## 2017-02-27 LAB — INTERPRETATION ECG - MUSE: NORMAL

## 2023-01-30 ENCOUNTER — OFFICE VISIT (OUTPATIENT)
Dept: URGENT CARE | Facility: URGENT CARE | Age: 68
End: 2023-01-30
Payer: COMMERCIAL

## 2023-01-30 VITALS
BODY MASS INDEX: 29.92 KG/M2 | DIASTOLIC BLOOD PRESSURE: 84 MMHG | SYSTOLIC BLOOD PRESSURE: 129 MMHG | TEMPERATURE: 97.9 F | HEART RATE: 101 BPM | WEIGHT: 208.5 LBS | OXYGEN SATURATION: 97 %

## 2023-01-30 DIAGNOSIS — J01.20 ACUTE NON-RECURRENT ETHMOIDAL SINUSITIS: Primary | ICD-10-CM

## 2023-01-30 PROCEDURE — 99203 OFFICE O/P NEW LOW 30 MIN: CPT | Performed by: FAMILY MEDICINE

## 2023-01-30 RX ORDER — ROSUVASTATIN CALCIUM 10 MG/1
TABLET, COATED ORAL
COMMUNITY
Start: 2022-11-30

## 2023-01-30 RX ORDER — LOSARTAN POTASSIUM AND HYDROCHLOROTHIAZIDE 25; 100 MG/1; MG/1
TABLET ORAL
COMMUNITY
Start: 2023-01-30

## 2023-01-30 NOTE — PATIENT INSTRUCTIONS
Augmentin twice a day for a week      Fluticasone nasal steroid once a day in each nostril      If symptoms not better in next few days reach out to your Doctor's office

## 2023-01-30 NOTE — PROGRESS NOTES
Assessment & Plan     Acute non-recurrent ethmoidal sinusitis  - amoxicillin-clavulanate (AUGMENTIN) 875-125 MG tablet  Dispense: 14 tablet; Refill: 0     Surprisingly first time episode of sinusitis affecting his ethmoid area has thick nasal discharge on examination today.  He does have a mild septal deviation to the mid proximately 1+.  Continue nasal saline rinses have also recommended he start nasal steroids along with an oral course of Augmentin twice daily for a week.  Follow-up in 2 to 3 days if symptoms not improving.  Patient absent of any respiratory distress.  Julio Cesar Resendez MD   [facility]    Subjective     Jorden is a 67 year old male who presents to clinic today for the following health issues:  Chief Complaint   Patient presents with     URI     Start 10 days sx sinus pressure- nose/ear, nasal congestion- thick/yellow, and spotting of blood, tx Decongestant, Sandoz-Mometasone 50MCG and Rupall 10 MG      HPI    Symptoms as noted above patient has no issues with any cough symptoms at this time.  He does frequent [facility] to visit family there  Once prescribed him a different generic version of a second-generation antihistamine he has tried this medication along with a decongestant with minimal improvement.  Most of his pressure is in the upper and nasal passages.  Denies any cough.  No known exposures to COVID or flu mention.  No prior history of sinus infections.      Patient Active Problem List    Diagnosis Date Noted     SOB (shortness of breath) 02/25/2017     Priority: Medium     Current Outpatient Medications   Medication     amoxicillin-clavulanate (AUGMENTIN) 875-125 MG tablet     ASPIRIN 81 PO     losartan-hydrochlorothiazide (HYZAAR) 100-25 MG tablet     rosuvastatin (CRESTOR) 10 MG tablet     lisinopril (PRINIVIL/ZESTRIL) 10 MG tablet     No current facility-administered medications for this visit.         Objective    /84   Pulse 101   Temp 97.9  F (36.6  C)   Wt  94.6 kg (208 lb 8 oz)   SpO2 97%   BMI 29.92 kg/m    Physical Exam   GEN: NAD  Nose: thick green nasal drainage from R nostril, appears to have a deviated mid septum towards right  Throat: no lesions, uvula midline  CV: HDS  Pulm: non-labored  No results found for any visits on 01/30/23.            The use of Dragon/[facility] may have been used to construct the content in this note; any grammatical or spelling errors are non-intentional. Please contact the author of this note directly if you are in need of any clarification.

## 2023-04-05 ENCOUNTER — OFFICE VISIT (OUTPATIENT)
Dept: URGENT CARE | Facility: URGENT CARE | Age: 68
End: 2023-04-05
Payer: COMMERCIAL

## 2023-04-05 VITALS
DIASTOLIC BLOOD PRESSURE: 86 MMHG | TEMPERATURE: 98.4 F | HEART RATE: 86 BPM | SYSTOLIC BLOOD PRESSURE: 138 MMHG | OXYGEN SATURATION: 96 %

## 2023-04-05 DIAGNOSIS — L08.9 INFECTED FINGER: Primary | ICD-10-CM

## 2023-04-05 DIAGNOSIS — M79.5 FOREIGN BODY (FB) IN SOFT TISSUE: ICD-10-CM

## 2023-04-05 PROCEDURE — 99213 OFFICE O/P EST LOW 20 MIN: CPT | Mod: 25 | Performed by: FAMILY MEDICINE

## 2023-04-05 PROCEDURE — 10120 INC&RMVL FB SUBQ TISS SMPL: CPT | Mod: 52 | Performed by: FAMILY MEDICINE

## 2023-04-05 NOTE — PROGRESS NOTES
SUBJECTIVE:  Chief Complaint   Patient presents with     Urgent Care     Finger in r sliver x 6 weeks-  was really swollen  yx     Jorden Benavidez is a 67 year old male presents with a chief complaint of right index finger pain.    Has small wood splinter in pad of right index finger about 6 weeks ago.  Was able to remove part of the splinter but residual broke off.  Did have mild discomfort but resolved.  Was seen by primary provider the following day for CPE, states that primary provider ended up scraping area but did not obtain any foreign body.    Developed more pain and swelling over the past 1 day.     Tdap 2017    No past medical history on file.  Current Outpatient Medications   Medication Sig Dispense Refill     ASPIRIN 81 PO        losartan-hydrochlorothiazide (HYZAAR) 100-25 MG tablet        rosuvastatin (CRESTOR) 10 MG tablet        lisinopril (PRINIVIL/ZESTRIL) 10 MG tablet Take 1 tablet (10 mg) by mouth daily (Patient not taking: Reported on 1/30/2023) 30 tablet 0     Social History     Tobacco Use     Smoking status: Never     Smokeless tobacco: Not on file   Vaping Use     Vaping status: Never Used   Substance Use Topics     Alcohol use: Yes       ROS:  Review of systems negative except as stated above.    EXAM:   /86   Pulse 86   Temp 98.4  F (36.9  C) (Tympanic)   SpO2 96%   Gen: healthy,alert,no distress  Extremity: right finger has mild swelling and pinkness, mild discomfort.   There is not compromise to the distal circulation.  Pulses are +2 and CRT is brisk  PSYCH:alert, affect bright    X-RAY was not done.    ASSESSMENT/PLAN:   (L08.9) Infected finger  (primary encounter diagnosis)  Comment: right index finger  Plan: amoxicillin-clavulanate (AUGMENTIN) 875-125 MG         tablet            (M79.5) Foreign body (FB) in soft tissue  Comment: right index finger  Plan: attempted removal      Discussed prolong foreign body in finger causing finger infection - RX Augmentin given for  treatment, encourage tylenol and ibuprofen for discomfort.    Patient desires procedure to see if able to remove foreign body, verbal consent given.  Lidocaine 1% 2 cc anesthestized right index finger.  11 blade use to make incision on pad of finger, sharp tweezers inserted and probed multiple times with no obvious foreign body found.  Scant bleeding.  No purulent drainage.  Dressing applied.      Discussed that foreign body may still work its way out, would need to be able to be visible for removal.  Take full course of antibiotic prescribed for infection.    Follow up with primary provider if no improvement of symptoms in 1-2 weeks    Harpreet Hernandez MD  April 5, 2023 5:50 PM

## 2024-04-30 ENCOUNTER — OFFICE VISIT (OUTPATIENT)
Dept: URGENT CARE | Facility: URGENT CARE | Age: 69
End: 2024-04-30
Payer: COMMERCIAL

## 2024-04-30 ENCOUNTER — ANCILLARY PROCEDURE (OUTPATIENT)
Dept: GENERAL RADIOLOGY | Facility: CLINIC | Age: 69
End: 2024-04-30
Attending: NURSE PRACTITIONER
Payer: COMMERCIAL

## 2024-04-30 VITALS
SYSTOLIC BLOOD PRESSURE: 125 MMHG | HEART RATE: 91 BPM | TEMPERATURE: 97.1 F | RESPIRATION RATE: 20 BRPM | OXYGEN SATURATION: 94 % | DIASTOLIC BLOOD PRESSURE: 80 MMHG

## 2024-04-30 DIAGNOSIS — J06.9 BACTERIAL URI: ICD-10-CM

## 2024-04-30 DIAGNOSIS — B96.89 BACTERIAL URI: ICD-10-CM

## 2024-04-30 DIAGNOSIS — Z87.09: ICD-10-CM

## 2024-04-30 DIAGNOSIS — R05.3 PERSISTENT COUGH FOR 3 WEEKS OR LONGER: ICD-10-CM

## 2024-04-30 DIAGNOSIS — J20.9 ACUTE BRONCHITIS WITH SYMPTOMS GREATER THAN 10 DAYS: Primary | ICD-10-CM

## 2024-04-30 DIAGNOSIS — R06.02 SOB (SHORTNESS OF BREATH): ICD-10-CM

## 2024-04-30 PROBLEM — K63.5 POLYP OF COLON: Status: ACTIVE | Noted: 2022-03-02

## 2024-04-30 PROBLEM — D12.2 BENIGN NEOPLASM OF ASCENDING COLON: Status: ACTIVE | Noted: 2022-03-04

## 2024-04-30 PROCEDURE — 71046 X-RAY EXAM CHEST 2 VIEWS: CPT | Mod: TC | Performed by: RADIOLOGY

## 2024-04-30 PROCEDURE — 99213 OFFICE O/P EST LOW 20 MIN: CPT | Performed by: NURSE PRACTITIONER

## 2024-04-30 RX ORDER — AZITHROMYCIN 250 MG/1
TABLET, FILM COATED ORAL
Qty: 6 TABLET | Refills: 0 | Status: SHIPPED | OUTPATIENT
Start: 2024-04-30 | End: 2024-05-05

## 2024-04-30 NOTE — PROGRESS NOTES
Assessment & Plan      Diagnosis Comments   1. Acute bronchitis with symptoms greater than 10 days  azithromycin (ZITHROMAX) 250 MG tablet Home care reviewed. Patient verbalized understanding; will monitor symptoms closely. Reviewed s/e to medications.   Follow up with primary care in 1 week if symptoms not improving.     Handout given from epic and reviewed.        2. SOB (shortness of breath)  XR Chest 2 Views negative      3. Persistent cough for 3 weeks or longer  XR Chest 2 Views       4. Bacterial URI  azithromycin (ZITHROMAX) 250 MG tablet       5. History of pneumoconiosis  History of recurring           TRENT Torres [facility] SANDRINE Leonardo is a 68 year old male who presents to clinic today for the following health issues:  Chief Complaint   Patient presents with    Urgent Care     - 1 Month  - Cough  - Feel it more in the chest now  - OTC cough and cold meds for symptoms     HPI      Presents clinic states over the past 4 to 5 weeks has had symptoms of sinus congestion that moved into chest is now having upper respiratory cough some mild shortness of breath states he has a history of pneumonia.  He noted his oxygen saturation has been dropping at home cough has become more productive.  Generalized fatigue and bodyaches accompany symptoms no fever.    Review of Systems  Constitutional, HEENT, cardiovascular, pulmonary, gi and gu systems are negative, except as otherwise noted.      Objective    /80   Pulse 91   Temp 97.1  F (36.2  C) (Tympanic)   SpO2 94%   Physical Exam   GENERAL: alert, no distress, and fatigued  EYES: Eyes grossly normal to inspection, PERRL and conjunctivae and sclerae normal  HENT: normal cephalic/atraumatic, ear canals and TM's normal, nose and mouth without ulcers or lesions, oropharynx clear, and oral mucous membranes moist  NECK: bilateral anterior cervical adenopathy, no asymmetry, masses, or scars, and thyroid normal  to palpation  RESP: decreased breath sounds bibasilar  CV: regular rate and rhythm, normal S1 S2, no S3 or S4, no murmur, click or rub, no peripheral edema  ABDOMEN: soft, nontender, no hepatosplenomegaly, no masses and bowel sounds normal  MS: no gross musculoskeletal defects noted, no edema  SKIN: no suspicious lesions or rashes    Results for orders placed or performed in visit on 04/30/24   XR Chest 2 Views     Status: None    Narrative    CHEST TWO VIEWS  4/30/2024 3:35 PM     HISTORY:  Shortness of breath. Persisting cough.    COMPARISON: 2/24/2017.      Impression    IMPRESSION: No significant interval change. Tortuous calcified  thoracic aorta. Lungs clear. No pleural effusions.    LATASHA LAGUERRE MD         SYSTEM ID:  [id number]